# Patient Record
Sex: FEMALE | Race: WHITE | NOT HISPANIC OR LATINO | URBAN - METROPOLITAN AREA
[De-identification: names, ages, dates, MRNs, and addresses within clinical notes are randomized per-mention and may not be internally consistent; named-entity substitution may affect disease eponyms.]

---
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
## 2018-02-01 ENCOUNTER — OFFICE VISIT - RIVER FALLS (OUTPATIENT)
Dept: FAMILY MEDICINE | Facility: CLINIC | Age: 18
End: 2018-02-01

## 2018-02-01 ASSESSMENT — MIFFLIN-ST. JEOR: SCORE: 1411.89

## 2018-04-02 ENCOUNTER — OFFICE VISIT - RIVER FALLS (OUTPATIENT)
Dept: FAMILY MEDICINE | Facility: CLINIC | Age: 18
End: 2018-04-02

## 2022-02-11 VITALS
DIASTOLIC BLOOD PRESSURE: 70 MMHG | TEMPERATURE: 97.3 F | HEART RATE: 68 BPM | SYSTOLIC BLOOD PRESSURE: 110 MMHG | WEIGHT: 134.6 LBS

## 2022-02-11 VITALS
WEIGHT: 142 LBS | SYSTOLIC BLOOD PRESSURE: 100 MMHG | DIASTOLIC BLOOD PRESSURE: 66 MMHG | BODY MASS INDEX: 22.82 KG/M2 | HEIGHT: 66 IN | HEART RATE: 60 BPM | TEMPERATURE: 97.6 F

## 2022-02-11 VITALS
SYSTOLIC BLOOD PRESSURE: 116 MMHG | TEMPERATURE: 98.2 F | HEART RATE: 76 BPM | WEIGHT: 140 LBS | DIASTOLIC BLOOD PRESSURE: 72 MMHG

## 2022-02-16 NOTE — PROGRESS NOTES
Patient:   DESIRAE DEAN            MRN: [id number]            FIN: [id number]               Age:   17 years     Sex:  Female     :  2000   Associated Diagnoses:   Screening for STD (sexually transmitted disease); STD exposure   Author:   Erum Asencio      Chief Complaint   9/15/2017 9:06 AM CDT    STD testing      History of Present Illness   PPC for std screening, partner of 1 month told her he just found out he has chlamydia.  He has received treatment  Desirae tells me there is inconsistent condom use, she has IUD, no fever or abdominal pain   she is having post coital bleeding      Review of Systems   Constitutional:  Negative.    Ear/Nose/Mouth/Throat:  Negative.    Respiratory:  Negative.    Cardiovascular:  Negative.    Gastrointestinal:  Negative.    Genitourinary:  Negative.    Gynecologic:  Negative except as documented in history of present illness.    Hematology/Lymphatics:  Negative.    Endocrine:  Negative.    Immunologic:  Negative.    Neurologic:  Negative.    Psychiatric:  Negative.       Health Status   Allergies:    Allergic Reactions (Selected)  No known allergies   Medications:  (Selected)   Prescriptions  Prescribed  Zithromax 1 g oral liquid: 1 EA ( 1 gm ), PO, Once, # 1 EA, 0 Refill(s), Type: Soft Stop, [id number], if liquid not available please dispense 1gm total of tablets, 1 EA po once  Documented Medications  Documented  Argenis 13.5 mg intrauterine device: 1 EA ( 13.5 mg ), intrauteral, once, 0 Refill(s), Type: Maintenance      Physical Examination   Vital Signs   9/15/2017 9:06 AM CDT Temperature Tympanic 97.3 DegF  LOW    Peripheral Pulse Rate 68 bpm    Pulse Site Radial artery    HR Method Manual    Systolic Blood Pressure 110 mmHg    Diastolic Blood Pressure 70 mmHg    Mean Arterial Pressure 83 mmHg    BP Site Right arm    BP Method Manual      Eye:  Pupils are equal, round and reactive to light.    HENT:  Normocephalic.    Gastrointestinal:   Soft, Non-tender, Non-distended, Normal bowel sounds.    Genitourinary:  No costovertebral angle tenderness.         Groin/ inguinal region: Bilateral, Within normal limits.         Labia: Bilateral, Majora, Minora, Within normal limits.         Vagina: Discharge ( White ).         Cervix: Discharge, IUD strings visible.         Uterus: Within normal limits.         Ovaries: Bilateral, Within normal limits.         Adnexa: Bilateral, Within normal limits.    Musculoskeletal:  Normal range of motion.    Integumentary:  Warm, Dry, Pink.    Neurologic:  Alert, Oriented.    Psychiatric:  Cooperative.       Review / Management   Results review:  Lab results   9/15/2017 9:55 AM CDT Wet Prep Yeast None Seen    Wet Prep Trichomonas None Seen    Wet Prep Clue Cells None Seen   .       Impression and Plan   Diagnosis     Screening for STD (sexually transmitted disease) (TNU92-LH Z11.3).     STD exposure (IXO31-FI Z20.2).     Patient Instructions:       Counseled: Patient, Regarding diagnosis, Regarding treatment, Regarding medications, Activity, Verbalized understanding.    Summary:  STD screening obtained, zithromax and rocephin given  if G+C is positive she will need test for cure in 6-8 weeks, no intercourse for next week  if gonorrhea positive partner needs to be treated  cautioned her about increased risks of PID d/t IUD and STD exposures.    Orders     Orders (Selected)   Prescriptions  Prescribed  Zithromax 1 g oral liquid: 1 EA ( 1 gm ), PO, Once, # 1 EA, 0 Refill(s), Type: Soft Stop, Pharmacy: [facility] [id number], if liquid not available please dispense 1gm total of tablets, 1 EA po once.

## 2022-02-16 NOTE — PROGRESS NOTES
Patient:   TITA DEAN            MRN: [id number]            FIN: [id number]               Age:   17 years     Sex:  Female     :  2000   Associated Diagnoses:   Screening for STD (sexually transmitted disease); Vaginal discharge; Yeast vaginitis   Author:   Michelle Yap      Chief Complaint   2018 2:24 PM CST     Here for STD testing.  has bleeding during intercourse. Has kyleena IUD  On tamiflu currently and antibiotic for ear infection.      History of Present Illness   Chief complaint and concerns discussed with patient and confirmed as above.  Has Kyleena, place elsewhere, she is not sure of the date but is sure it is Kyleena IUD  Infected with chlamydia in Sept, treated, no further IC with that partner. has had one partner since and for the past 2 weeks, again having bleeding with IC (had same with chlamydia infection). No pain, no discharge, no itch, no sores. Not using condoms much  Started meds for respiratory infection recently , but bleeding with IC started before that  WOuld like to repeat other STi infection testing that was done in September      Health Status   Allergies:    Allergic Reactions (Selected)  No known allergies   Medications:  (Selected)   Prescriptions  Prescribed  Zithromax 1 g oral liquid: 1 EA ( 1 gm ), PO, Once, # 1 EA, 0 Refill(s), Type: Soft Stop, Pharmacy: [id number], if liquid not available please dispense 1gm total of tablets, 1 EA po once  Documented Medications  Documented  Argenis 13.5 mg intrauterine device: 1 EA ( 13.5 mg ), intrauteral, once, 0 Refill(s), Type: Maintenance   Problem list:    No problem items selected or recorded.      Histories   Past Medical History:    No active or resolved past medical history items have been selected or recorded.   Family History:    Asthma  Sister  Rheumatoid arthritis  Mother (Mili)     Procedure history:    No active procedure history items have been selected or recorded.   Social History:         Alcohol Assessment            Never      Tobacco Assessment            Never (less than 100 in lifetime)      Substance Abuse Assessment            Never      Home and Environment Assessment            Marital status: Single.      Exercise and Physical Activity Assessment            Exercise frequency: 5-6 times/week.  Exercise type: Soccer.      Sexual Assessment            Sexually active: Yes.  Sexual orientation: Straight or heterosexual.  Uses condoms: Yes.  Contraceptive Use               Details: Intrauterine device.        Physical Examination   Vital Signs   2/1/2018 2:24 PM CST Temperature Temporal 97.6 DegF    Peripheral Pulse Rate 60 bpm    Systolic Blood Pressure 100 mmHg    Diastolic Blood Pressure 66 mmHg    Mean Arterial Pressure 77 mmHg      Measurements from flowsheet : Measurements   2/1/2018 2:24 PM CST Height Measured - Standard 65.75 in    Height/Length Estimated 65.75 in    Weight Measured - Standard 142 lb    Weight Estimated 142 lb    BSA 1.73 m2    BSA Estimated 1.73 m2    Body Mass Index 23.09 kg/m2    Body Mass Index Estimated 23.09 kg/m2    Body Mass Index Percentile 65.60      General:  Alert and oriented.    Genitourinary:  No inguinal tenderness, No urethral discharge, No lesions, vault mildly inflamed with some thick white DC. Blue strings at cervical os approx 1 cm long, no CMT, no adnexal tenderness or masses.       Review / Management   Results review:  Lab results   2/1/2018 2:57 PM CST Wet Prep Yeast Present    Wet Prep Trichomonas None Seen    Wet Prep Clue Cells None Seen   .       Impression and Plan   Diagnosis     Screening for STD (sexually transmitted disease) (OCQ89-UX Z11.3).     Vaginal discharge (VKS48-RE N89.8).     Yeast vaginitis (HMC33-UI B37.3).     Patient Instructions:       Counseled: Patient, Regarding diagnosis, Regarding treatment, Regarding medications, Verbalized understanding, use condoms ALWAYS.    Orders     Orders (Selected)   Outpatient  Orders  Ordered (In Transit)  Chlamydia/Neisseria gonorrhoeae RNA, TMA* ([facility]): Specimen Type: Swab, Collection Date: 02/01/18 14:47:00 CST  HIV-1/2 Antigen and Antibodies, Fourth Generation, with Reflexes* ([facility]): Specimen Type: Serum, Collection Date: 02/01/18 14:49:00 CST  RPR (dx) w/refl titer and confirmatory testing* ([facility]): Specimen Type: Serum, Collection Date: 02/01/18 14:49:00 CST  Prescriptions  Prescribed  fluconazole 150 mg oral tablet: 1 tab(s) ( 150 mg ), PO, Once, # 1 tab(s), 0 Refill(s), Type: Soft Stop, Pharmacy: [facility] [id number], 1 tab(s) po once.

## 2022-02-16 NOTE — PROGRESS NOTES
Patient:   TITA DEAN            MRN: [id number]            FIN: [id number]               Age:   17 years     Sex:  Female     :  2000   Associated Diagnoses:   Blepharitis; Periorbital dermatitis   Author:   Erum Asencio      Visit Information      Date of Service: 2018 04:51 pm  Performing Location: [facility]  Encounter#: [id number]      Chief Complaint   2018 5:10 PM CDT     Pt c/o bilateral eye swelling and itching since this am. Some crust build-up.        History of Present Illness   PPC for bilateral eyelid swelling and irritation which has been present for 12 hours, used new mascara yesterday and has sensitivit skin, within hours of using make up her eyelids began to itch, she removed mascara last night but lids have remain irritated today and puffy, no discharge from eyes, no vision changes,  no occular pain      Review of Systems   Constitutional:  Negative.    Eye:  Negative except as documented in history of present illness.    Ear/Nose/Mouth/Throat:  Negative.    Respiratory:  Negative.    Cardiovascular:  Negative.    Integumentary:  Negative except as documented in history of present illness.    Neurologic:  Negative.    Psychiatric:  Negative.       Health Status   Allergies:    Allergic Reactions (Selected)  No known allergies   Medications:  (Selected)   Documented Medications  Documented  Kyleena 19.5 mg intrauterine device: 1 EA ( 19.5 mg ), intrauteral, once, Instructions: placed 2017, 0 Refill(s), Type: Maintenance      Histories   Family History:    Asthma  Sister  Rheumatoid arthritis  Mother (Mili)        Physical Examination   Vital Signs   2018 5:10 PM CDT Temperature Tympanic 98.2 DegF    Peripheral Pulse Rate 76 bpm    Pulse Site Radial artery    HR Method Manual    Systolic Blood Pressure 116 mmHg    Diastolic Blood Pressure 72 mmHg    Mean Arterial Pressure 87 mmHg    BP Site Right arm    BP Method Manual      General:  Alert  and oriented, No acute distress.    Eye:  Pupils are equal, round and reactive to light, right upper eyelid and left upper eyelid puffy and pink, no open skin, no skin breaks  lower eyelid on left on outer edge pink and swollen  lower eyelid on right outer edge pink swollen   sclerae white without injection, no purulent matter.    HENT:  Normocephalic, Tympanic membranes are clear.    Respiratory:  Respirations are non-labored.    Cardiovascular:  Regular rhythm, No edema.    Musculoskeletal:  Normal range of motion, Normal gait.    Integumentary:  Warm, Dry, Pink.    Neurologic:  Alert, Oriented, Normal sensory, No focal deficits.    Psychiatric:  Cooperative, Appropriate mood & affect, Normal judgment.       Impression and Plan   Diagnosis     Blepharitis (GYZ79-LL H01.009).     Periorbital dermatitis (EQX52-RI L30.9).     Plan:  blepharitis versus zain-orbital dermatitis  stop using mascara, throw it away  continue using zyrtec, can use small amt of 1% hydrocortisone on q tip on lash line for next 3 days to help with itching but not to use this long term  if weekend comes an dlids are still irritated can use amoxicillin or minocycline for zain-orbital dermatitis suppression.

## 2022-05-02 ENCOUNTER — LAB REQUISITION (OUTPATIENT)
Dept: LAB | Facility: CLINIC | Age: 22
End: 2022-05-02

## 2022-05-02 PROCEDURE — 86481 TB AG RESPONSE T-CELL SUSP: CPT | Performed by: INTERNAL MEDICINE

## 2022-05-04 LAB
GAMMA INTERFERON BACKGROUND BLD IA-ACNC: 0.96 IU/ML
M TB IFN-G BLD-IMP: NEGATIVE
M TB IFN-G CD4+ BCKGRND COR BLD-ACNC: 9.04 IU/ML
MITOGEN IGNF BCKGRD COR BLD-ACNC: -0.04 IU/ML
MITOGEN IGNF BCKGRD COR BLD-ACNC: 0 IU/ML
QUANTIFERON MITOGEN: 10 IU/ML
QUANTIFERON NIL TUBE: 0.96 IU/ML
QUANTIFERON TB1 TUBE: 0.92 IU/ML
QUANTIFERON TB2 TUBE: 0.96

## 2022-07-11 ENCOUNTER — OFFICE VISIT (OUTPATIENT)
Dept: FAMILY MEDICINE | Facility: CLINIC | Age: 22
End: 2022-07-11
Payer: COMMERCIAL

## 2022-07-11 VITALS
RESPIRATION RATE: 20 BRPM | SYSTOLIC BLOOD PRESSURE: 111 MMHG | TEMPERATURE: 97.7 F | WEIGHT: 167 LBS | DIASTOLIC BLOOD PRESSURE: 70 MMHG | HEART RATE: 64 BPM | OXYGEN SATURATION: 97 % | BODY MASS INDEX: 27.16 KG/M2

## 2022-07-11 DIAGNOSIS — L03.115 CELLULITIS OF RIGHT LOWER EXTREMITY: Primary | ICD-10-CM

## 2022-07-11 PROCEDURE — 99203 OFFICE O/P NEW LOW 30 MIN: CPT | Performed by: STUDENT IN AN ORGANIZED HEALTH CARE EDUCATION/TRAINING PROGRAM

## 2022-07-11 RX ORDER — CEFPODOXIME PROXETIL 200 MG/1
400 TABLET, FILM COATED ORAL 2 TIMES DAILY
Qty: 28 TABLET | Refills: 0 | Status: SHIPPED | OUTPATIENT
Start: 2022-07-11 | End: 2022-07-18

## 2022-07-11 RX ORDER — IBUPROFEN 200 MG
400 TABLET ORAL
COMMUNITY

## 2022-07-11 NOTE — PROGRESS NOTES
SUBJECTIVE:  Desirae Romero is an 21 year old female who presents for    Right Leg infection  - cyst present for over a year  - worsened over weekend after scratching it  -No fever, chills, other symptoms    PMH:   has no past medical history on file.  There is no problem list on file for this patient.    Social History     Socioeconomic History     Marital status: Single     Spouse name: None     Number of children: None     Years of education: None     Highest education level: None   Tobacco Use     Smoking status: Never Smoker     Smokeless tobacco: Never Used   Substance and Sexual Activity     Drug use: Never     No family history on file.    ALLERGIES:  Patient has no known allergies.    Current Outpatient Medications   Medication     ibuprofen (ADVIL/MOTRIN) 200 MG tablet     levonorgestrel (KYLEENA) 19.5 MG IUD     No current facility-administered medications for this visit.         ROS:  ROS is done and is negative for general/constitutional, eye, ENT, Respiratory, cardiovascular, GI, , Skin, musculoskeletal except as noted elsewhere.  All other review of systems negative except as noted elsewhere.    OBJECTIVE:  /70 (BP Location: Right arm, Patient Position: Sitting, Cuff Size: Adult Regular)   Pulse 64   Temp 97.7  F (36.5  C) (Oral)   Resp 20   Wt 75.8 kg (167 lb)   LMP  (LMP Unknown)   SpO2 97%   BMI 27.16 kg/m    GENERAL APPEARANCE: Alert, in no acute distress.  SKIN: right lateral calf redness/swelling, mild ttp      RESULTS  No results found for any visits on 07/11/22.  No results found for this or any previous visit (from the past 48 hour(s)).    ASSESSMENT/PLAN:    Right lower extremity cellulitis  Patient had a cyst in the region, scratch over that area recently and it began to get red and painful.  No fever or systemic symptoms.  No appreciable abscesses or anything to drain.  Likely cellulitis.  -Cefpodoxime 7 days  -Guidance/precautions given  -Follow-up as needed    PPE  worn: Yes    Options for treatment and follow-up care were reviewed with the patient and/or guardian. Desirae Romero and/or guardian engaged in the decision making process and verbalized understanding of the options discussed and agreed with the final plan.    See [facility] for orders, medications, letters, patient instructions    Daquan Nicholson DO, MBA

## 2023-02-12 ENCOUNTER — HEALTH MAINTENANCE LETTER (OUTPATIENT)
Age: 23
End: 2023-02-12

## 2023-08-17 PROBLEM — A74.9 INFECTION DUE TO CHLAMYDIA SPECIES: Status: ACTIVE | Noted: 2023-08-17

## 2023-08-17 PROBLEM — R87.615 UNSATISFACTORY CERVICAL PAPANICOLAOU SMEAR: Status: ACTIVE | Noted: 2022-04-01

## 2023-08-17 PROBLEM — N72 CHRONIC CERVICITIS: Status: ACTIVE | Noted: 2022-04-01

## 2023-08-17 PROBLEM — B96.89 BV (BACTERIAL VAGINOSIS): Status: ACTIVE | Noted: 2022-04-01

## 2023-08-17 PROBLEM — Z97.5 IUD (INTRAUTERINE DEVICE) IN PLACE: Status: ACTIVE | Noted: 2017-05-23

## 2023-08-17 PROBLEM — N76.0 BV (BACTERIAL VAGINOSIS): Status: ACTIVE | Noted: 2022-04-01

## 2024-03-10 ENCOUNTER — HEALTH MAINTENANCE LETTER (OUTPATIENT)
Age: 24
End: 2024-03-10

## 2025-01-02 ENCOUNTER — OFFICE VISIT (OUTPATIENT)
Dept: MIDWIFE SERVICES | Facility: CLINIC | Age: 25
End: 2025-01-02
Payer: COMMERCIAL

## 2025-01-02 VITALS
BODY MASS INDEX: 21.86 KG/M2 | HEIGHT: 66 IN | WEIGHT: 136 LBS | DIASTOLIC BLOOD PRESSURE: 70 MMHG | SYSTOLIC BLOOD PRESSURE: 114 MMHG

## 2025-01-02 DIAGNOSIS — Z97.5 IUD (INTRAUTERINE DEVICE) IN PLACE: ICD-10-CM

## 2025-01-02 DIAGNOSIS — Z78.9 VEGAN DIET: ICD-10-CM

## 2025-01-02 DIAGNOSIS — R63.4 WEIGHT LOSS: ICD-10-CM

## 2025-01-02 DIAGNOSIS — E28.2 PCOS (POLYCYSTIC OVARIAN SYNDROME): ICD-10-CM

## 2025-01-02 DIAGNOSIS — Z01.419 WELL WOMAN EXAM: Primary | ICD-10-CM

## 2025-01-02 DIAGNOSIS — Z00.00 HEALTHCARE MAINTENANCE: ICD-10-CM

## 2025-01-02 PROBLEM — N76.0 BV (BACTERIAL VAGINOSIS): Status: RESOLVED | Noted: 2022-04-01 | Resolved: 2025-01-02

## 2025-01-02 PROBLEM — A74.9 INFECTION DUE TO CHLAMYDIA SPECIES: Status: RESOLVED | Noted: 2023-08-17 | Resolved: 2025-01-02

## 2025-01-02 PROBLEM — R87.615 UNSATISFACTORY CERVICAL PAPANICOLAOU SMEAR: Status: RESOLVED | Noted: 2022-04-01 | Resolved: 2025-01-02

## 2025-01-02 PROBLEM — B96.89 BV (BACTERIAL VAGINOSIS): Status: RESOLVED | Noted: 2022-04-01 | Resolved: 2025-01-02

## 2025-01-03 NOTE — PROGRESS NOTES
Assessment:   1.  Well woman exam  2.  Contraceptive management-Kyleena IUD  3.  Weight loss, intentional  4.  BMI 22  5.  PCOS evidence on ultrasound 2017     Plan:      1. Discussed nutrition and exercise.  Advised MVI, Vitamin D3 2000IU geltab and an omega 3 supplement daily   2. Blood tests: Future FASTING lipid panel, CBC, TSH with reflex free T4, CMP and hemoglobin A1c.  3. Breast self exam technique reviewed and patient encouraged to perform self-exam monthly.   4. Contraception: Kyleena IUD.  Considering placement July 2021, this writer explains that switching out Kyleena is not indicated until July 2026, patient agrees.  5.  Next pap due July 2026.  6.  Pelvic ultrasound ordered.  7.  RTC 1 year for annual physical exam, PRN    Subjective:   Desirae Romero is a 24 year old female who presents for an annual exam.  She believes her Kyleena IUD needs to be replaced now.  She agrees it was placed in July 2021 and that it is Kyleena for sure.  She is happy, and a relationship that is monogamous with her boyfriend Ramesh.  This last year, she lost about 40 pounds intentionally by going to the gym more often, healthy vegan diet.  She offers no other concerns.  When this writer reviews previous imaging, 2017 pelvic ultrasound reveals polycystic ovaries, patient unaware of this.    Healthy Habits:   Regular Exercise: Yes   Sunscreen Use: Yes  Healthy Diet: Yes  Dental Visits Regularly: Yes  Seat Belt: Yes  Sexually active: Yes  STI risk No  domestic violence No    Self Breast Exam Monthly:Yes  Colonoscopy: No  Lipid Profile: No  Glucose Screen: No  Prevention of Osteoporosis: Yes  Last Dexa: N/A      Immunization History   Administered Date(s) Administered    COVID-19 Bivalent 12+ (Pfizer) 11/02/2022    COVID-19 MONOVALENT 12+ (Pfizer) 09/30/2021, 10/18/2021    DTAP (<7y) 1998, 2000, 02/16/1999, 11/29/1999, 08/14/2003    Flu, Unspecified 09/14/2012, 10/02/2014, 11/04/2015, 01/14/2019, 09/30/2021     B4l9-11 Novel Flu P-free 2010    HEPATITIS A (PEDS 12M-18Y) 2017    HIB, Unspecified 1998, 1998, 1999    HPV Quadrivalent 2010, 10/18/2010, 2011    Hepatitis B, Peds 1998, 1998, 1999    Historic Hib Hib-titer 1998, 1998, 1999    Influenza (H1N1) 2010    Influenza (IIV3) PF 10/27/2008, 2009, 10/18/2010, 2012, 10/02/2014, 2015    Influenza (prior to ) 10/10/2007    Influenza Vaccine (Flucelvax Quadrivalent) 2021    Influenza Vaccine 65+ (Fluzone HD) 2021    Influenza Vaccine >6 months,quad, PF 2015, 2022, 2023    Influenza, Split Virus, Trivalent, Pf (Fluzone\Fluarix) 2024    Influenza,INJ,MDCK,PF,Quad >6mo(Flucelvax) 2019    MMR 1999, 2003, 2003    Meningococcal (Menomune ) 2014    Meningococcal ACWY (Menactra ) 2014    Meningococcal ACWY (Menveo ) 2017    Meningococcal Mcv4 Conjugate,unspecified  2014, 2017    Meningococcal,unspecified 2014    Poliovirus, inactivated (IPV) 1998, 1998, 1999, 2003    TD,PF 7+ (Tenivac) 2020    TDAP (Adacel,Boostrix) 2010, 2021    Td (Adult), Adsorbed 2020    Varicella 1999, 2007         Gynecologic History  No LMP recorded. (Menstrual status: IUD).  Contraception: Kyleena IUD    Cancer screening:   Last Pap: 2023. Results were: Normal    OB History    Para Term  AB Living   0 0 0 0 0 0   SAB IAB Ectopic Multiple Live Births   0 0 0 0 0       Current Outpatient Medications   Medication Sig Dispense Refill    ibuprofen (ADVIL/MOTRIN) 200 MG tablet Take 400 mg by mouth      levonorgestrel (KYLEENA) 19.5 MG IUD 1 Device by Intrauterine route       Past Medical History:   Diagnosis Date    Abnormal Pap smear of cervix     Chlamydia     Depression     Urinary tract infection     Yeast infection of the vagina   "    Past Surgical History:   Procedure Laterality Date    tosillectomy  2010    WISDOM TOOTH EXTRACTION  2016     Patient has no known allergies.  History reviewed. No pertinent family history.  Social History     Socioeconomic History    Marital status: Single     Spouse name: Not on file    Number of children: 0    Years of education: Not on file    Highest education level: Associate degree: occupational, technical, or vocational program   Occupational History    Not on file   Tobacco Use    Smoking status: Never    Smokeless tobacco: Never   Substance and Sexual Activity    Alcohol use: Not on file    Drug use: Never    Sexual activity: Not on file   Other Topics Concern    Not on file   Social History Narrative    Not on file     Social Drivers of Health     Financial Resource Strain: Not on file   Food Insecurity: Not on file   Transportation Needs: Not on file   Physical Activity: Not on file   Stress: Not on file   Social Connections: Not on file   Interpersonal Safety: Not on file   Housing Stability: Not on file       Review of Systems  General:  Denies problem  Eyes: Denies problem  Ears/Nose/Throat: Denies problem  Cardiovascular: Denies problem  Respiratory:  Denies problem  Gastrointestinal:  denies problems  Genitourinary: denies problems  Musculoskeletal:  Denies problem  Skin: Denies problem  Neurologic:denies problems  Psychiatric: denies problems  Endocrine: denies problems        Objective:     Vitals:    01/02/25 1503   BP: 114/70   Weight: 61.7 kg (136 lb)   Height: 1.664 m (5' 5.5\")       Physical Exam:  General Appearance: Alert, cooperative, no distress, appears stated age  Skin: Skin color, texture, turgor normal, no rashes or lesions  Throat: Lips, mucosa, and tongue normal; teeth and gums normal  HEENT: grossly normal; otoscopic and opthalmic exam not performed.   Neck: Supple, symmetrical, trachea midline;  thyroid: not enlarged, symmetric, no tenderness/mass/nodules  Lungs: Clear to " auscultation bilaterally, respirations unlabored  Breasts: No breast masses, tenderness, asymmetry, or nipple discharge.  Heart: Regular rate and rhythm, S1 and S2 normal, no murmur  Abdomen: Soft, non-tender.   Pelvic: Deferred, pelvic ultrasound ordered    US PELVIS COMPLETE TV  Order: [id number]  Impression    1. Uterus is normal in appearance with a properly positioned intrauterine  device.  3mm of blood moving in the endometrium from the fundus to the  lower uterine segment.  2. Bilateral polycystic ovaries.    Tammy Rios MD 6/1/2017 6:35 PM    [address] [address]  [phone]    Narrative    Gynecologic Ultrasound    Date of exam: 6/1/2017  Indication for exam: Breakthrough bleeding with IUD  Requesting Provider: Jolene Romo DO    TECHNIQUE:  Transabdominal scan was not performed. Transvaginal scan was performed for  improved visualization of the pelvic structures.    FINDINGS:  The uterus is present, anteverted, without deviation, and measures 6.4 x  2.5 x 3.7 cm.  The myometrium is homogenous.  There is evidence of properly positioned intrauterine device.  The endometrial thickness is 5.3 mm.  On the sine loops there is a lot of  movement of the endometrium with a density consistent with blood in the  endometrium.  There are no myomas noted.  The right ovary is identified and measures 2.6 x 2.1 x 2.2 cm and appears  polycystic.  The left ovary is identified and measures 3.1 x 1.4 x 2.4 cm and appears  polycystic.  Normal color and spectral doppler flow to both ovaries.  Free fluid in the cul de sac: none  Images on Order [id number]    Image Retrieve    The full-size image has not yet been retrieved from an outside organization. To retrieve, click the link below.  Scan on 6/1/2017 10:44 AM: US PELVIS COMPLETE TV        Exam End: 06/01/17 10:44 AM Last Resulted: 06/01/17  6:40 PM   [facility]  Result Received: 12/31/24   1:15 PM

## 2025-01-07 ENCOUNTER — HOSPITAL ENCOUNTER (OUTPATIENT)
Dept: ULTRASOUND IMAGING | Facility: HOSPITAL | Age: 25
Discharge: HOME | End: 2025-01-07
Attending: ADVANCED PRACTICE MIDWIFE
Payer: COMMERCIAL

## 2025-01-07 DIAGNOSIS — Z00.00 HEALTHCARE MAINTENANCE: ICD-10-CM

## 2025-01-07 DIAGNOSIS — Z97.5 IUD (INTRAUTERINE DEVICE) IN PLACE: ICD-10-CM

## 2025-01-07 PROCEDURE — 76856 US EXAM PELVIC COMPLETE: CPT

## 2025-01-09 ENCOUNTER — LAB (OUTPATIENT)
Dept: LAB | Facility: CLINIC | Age: 25
End: 2025-01-09
Payer: COMMERCIAL

## 2025-01-09 DIAGNOSIS — Z00.00 HEALTHCARE MAINTENANCE: ICD-10-CM

## 2025-01-09 LAB
ALBUMIN SERPL BCG-MCNC: 4.8 G/DL (ref 3.5–5.2)
ALP SERPL-CCNC: 45 U/L (ref 40–150)
ALT SERPL W P-5'-P-CCNC: 17 U/L (ref 0–50)
ANION GAP SERPL CALCULATED.3IONS-SCNC: 11 MMOL/L (ref 7–15)
AST SERPL W P-5'-P-CCNC: 28 U/L (ref 0–45)
BILIRUB SERPL-MCNC: 0.5 MG/DL
BUN SERPL-MCNC: 10.5 MG/DL (ref 6–20)
CALCIUM SERPL-MCNC: 10 MG/DL (ref 8.8–10.4)
CHLORIDE SERPL-SCNC: 103 MMOL/L (ref 98–107)
CHOLEST SERPL-MCNC: 175 MG/DL
CREAT SERPL-MCNC: 0.89 MG/DL (ref 0.51–0.95)
EGFRCR SERPLBLD CKD-EPI 2021: >90 ML/MIN/1.73M2
ERYTHROCYTE [DISTWIDTH] IN BLOOD BY AUTOMATED COUNT: 12.5 % (ref 10–15)
EST. AVERAGE GLUCOSE BLD GHB EST-MCNC: 82 MG/DL
FASTING STATUS PATIENT QL REPORTED: YES
FASTING STATUS PATIENT QL REPORTED: YES
GLUCOSE SERPL-MCNC: 90 MG/DL (ref 70–99)
HBA1C MFR BLD: 4.5 % (ref 0–5.6)
HCO3 SERPL-SCNC: 25 MMOL/L (ref 22–29)
HCT VFR BLD AUTO: 44.4 % (ref 35–47)
HDLC SERPL-MCNC: 86 MG/DL
HGB BLD-MCNC: 14.9 G/DL (ref 11.7–15.7)
LDLC SERPL CALC-MCNC: 69 MG/DL
MCH RBC QN AUTO: 31.2 PG (ref 26.5–33)
MCHC RBC AUTO-ENTMCNC: 33.6 G/DL (ref 31.5–36.5)
MCV RBC AUTO: 93 FL (ref 78–100)
NONHDLC SERPL-MCNC: 89 MG/DL
PLATELET # BLD AUTO: 201 10E3/UL (ref 150–450)
POTASSIUM SERPL-SCNC: 4.3 MMOL/L (ref 3.4–5.3)
PROT SERPL-MCNC: 7.5 G/DL (ref 6.4–8.3)
RBC # BLD AUTO: 4.77 10E6/UL (ref 3.8–5.2)
SODIUM SERPL-SCNC: 139 MMOL/L (ref 135–145)
TRIGL SERPL-MCNC: 100 MG/DL
TSH SERPL DL<=0.005 MIU/L-ACNC: 3.66 UIU/ML (ref 0.3–4.2)
WBC # BLD AUTO: 6.4 10E3/UL (ref 4–11)

## 2025-03-11 ENCOUNTER — OFFICE VISIT (OUTPATIENT)
Dept: URGENT CARE | Facility: URGENT CARE | Age: 25
End: 2025-03-11
Payer: COMMERCIAL

## 2025-03-11 VITALS
RESPIRATION RATE: 16 BRPM | TEMPERATURE: 98.3 F | DIASTOLIC BLOOD PRESSURE: 80 MMHG | SYSTOLIC BLOOD PRESSURE: 138 MMHG | OXYGEN SATURATION: 97 % | HEART RATE: 85 BPM

## 2025-03-11 DIAGNOSIS — J10.1 INFLUENZA A: Primary | ICD-10-CM

## 2025-03-11 DIAGNOSIS — R50.9 FEVER, UNSPECIFIED: ICD-10-CM

## 2025-03-11 LAB
FLUAV AG SPEC QL IA: POSITIVE
FLUBV AG SPEC QL IA: NEGATIVE

## 2025-03-11 PROCEDURE — 3079F DIAST BP 80-89 MM HG: CPT | Performed by: STUDENT IN AN ORGANIZED HEALTH CARE EDUCATION/TRAINING PROGRAM

## 2025-03-11 PROCEDURE — 99213 OFFICE O/P EST LOW 20 MIN: CPT | Performed by: STUDENT IN AN ORGANIZED HEALTH CARE EDUCATION/TRAINING PROGRAM

## 2025-03-11 PROCEDURE — 3075F SYST BP GE 130 - 139MM HG: CPT | Performed by: STUDENT IN AN ORGANIZED HEALTH CARE EDUCATION/TRAINING PROGRAM

## 2025-03-11 PROCEDURE — 87804 INFLUENZA ASSAY W/OPTIC: CPT | Performed by: STUDENT IN AN ORGANIZED HEALTH CARE EDUCATION/TRAINING PROGRAM

## 2025-03-11 RX ORDER — BENZONATATE 100 MG/1
100 CAPSULE ORAL 3 TIMES DAILY PRN
Qty: 30 CAPSULE | Refills: 0 | Status: SHIPPED | OUTPATIENT
Start: 2025-03-11

## 2025-03-11 RX ORDER — OSELTAMIVIR PHOSPHATE 75 MG/1
75 CAPSULE ORAL 2 TIMES DAILY
Qty: 10 CAPSULE | Refills: 0 | Status: SHIPPED | OUTPATIENT
Start: 2025-03-11 | End: 2025-03-16

## 2025-03-11 NOTE — LETTER
March 11, 2025      Desirae Romero  [address]  [place] [place] [address]        To Whom It May Concern:    Desirae Romero  was seen on 3/11/25.  Please excuse her 3/10/25-3/11/25 due to illness. May return when fever-free for 24 hours and symptoms have improved.         Sincerely,        Gillian Melgar PA-C    Electronically signed

## 2025-03-11 NOTE — PROGRESS NOTES
ASSESSMENT & PLAN:   Diagnoses and all orders for this visit:  Influenza A  -     oseltamivir (TAMIFLU) 75 MG capsule; Take 1 capsule (75 mg) by mouth 2 times daily for 5 days.  -     benzonatate (TESSALON) 100 MG capsule; Take 1 capsule (100 mg) by mouth 3 times daily as needed for cough.  Fever, unspecified  -     Influenza A & B Antigen - Clinic Collect    URI symptoms x 2 days. Vital stable. Clear lung sounds with no increased work of breathing. Influenza test positive for influenza A. She is within treatment window, Tamiflu x 5 days. Supportive cares discussed including rest, fluids, Tylenol/ibuprofen, Tessalon as needed.    At the end of the encounter, I discussed results, diagnosis, medications. Discussed red flags for immediate return to clinic/ER, as well as indications for follow up if no improvement. Patient and/or caregiver understood and agreed to plan. Patient was stable for discharge.    Patient Instructions   Influenza is typically considered contagious one day prior and 3-7 days after your symptoms begin. I recommend returning to work/school after you are fever free for 24 hours. Continue to practice good hand hygiene and cough coverage well after you are fever free.   Take Tylenol or Ibuprofen as needed for fever relief.   Increase fluids and rest.  Follow up if you develop fever that can not be controlled, difficulty breathing, or severe dehydration.    Influenza  Influenza ( the flu ) is an infection that affects your respiratory tract (the mouth, nose, and lungs, and the passages between them). Unlike a cold, the flu can make you very ill. And it can lead to pneumonia, a serious lung infection. For some people, especially older adults, young children, and people with certain chronic conditions, the flu can have serious complications and even be fatal.    How Does the Flu Spread?  The flu is caused by viruses. The viruses spread through the air in droplets when someone who has the flu coughs,  sneezes, laughs, or talks. You can become infected when you inhale these viruses directly. You can also become infected when you touch a surface on which the droplets have landed and then transfer the germs to your eyes, nose, or mouth. Touching used tissues, or sharing utensils, drinking glasses, or a toothbrush with an infected person can expose you to flu viruses, too.    What Are the Symptoms of the Flu?  Flu symptoms tend to come on quickly and may last a few days to a few weeks. They include:  Fever usually higher than 101 F (38.3 C) and chills  Sore throat and headache  Dry cough  Runny nose  Tiredness and weakness  Muscle aches    Factors That Can Make Flu Worse  For some people, the flu can be very serious. The risk of complications is greater for:  Children under age 5.  Adults 65 years of age and older.  People with a chronic illness, such as diabetes or heart, kidney, or lung disease.  People who live in a nursing home or long-term care facility.    How Is the Flu Treated?  Influenza usually improves after 7 days or so. In some cases, your health care provider may prescribe an antiviral medication. This may help you get well sooner. For the medication to help, you need to take it as soon as possible (ideally within 48 hours) after your symptoms start. If you develop pneumonia or other serious illness, hospital care may be needed.    Easing Flu Symptoms  Drink lots of fluids such as water, juice, and warm soup. A good rule is to drink enough so that you urinate your normal amount.  Get plenty of rest.  Tylenol/ibuprofen for fever.  Call your provider if you become dizzy, lightheaded, or short of breath.     No follow-ups on file.    ------------------------------------------------------------------------  SUBJECTIVE  History was obtained from patient.    Patient presents with:  Cough: X 2 days, cold flu symptoms, sinus, Lt ear pain, runny nose, fever started Sunday night, sweating, headaches, nauseous,  deep cough, no chest pain or wheezing    HPI  Desirae Romero is a(n) 24 year old female presenting to urgent care for URI symptoms x 2 days. Reports cough, sinus congestion, headache, cough, rhinorrhea, fever, fatigue. No sore throat. No diarrhea or vomiting. No known sick contacts.     Current Outpatient Medications   Medication Sig Dispense Refill    benzonatate (TESSALON) 100 MG capsule Take 1 capsule (100 mg) by mouth 3 times daily as needed for cough. 30 capsule 0    ibuprofen (ADVIL/MOTRIN) 200 MG tablet Take 400 mg by mouth      levonorgestrel (KYLEENA) 19.5 MG IUD 1 Device by Intrauterine route      oseltamivir (TAMIFLU) 75 MG capsule Take 1 capsule (75 mg) by mouth 2 times daily for 5 days. 10 capsule 0     Problem List:  2025-01: Weight loss  2025-01: PCOS (polycystic ovarian syndrome)  2025-01: Vegan diet  2023-08: Infection due to Chlamydia species  2022-04: BV (bacterial vaginosis)  2022-04: Chronic cervicitis  2022-04: Unsatisfactory cervical Papanicolaou smear  2017-05: IUD (intrauterine device) in place  2004-02: Astigmatism    No Known Allergies      OBJECTIVE  Vitals:    03/11/25 1053   BP: 138/80   Pulse: 85   Resp: 16   Temp: 98.3  F (36.8  C)   TempSrc: Oral   SpO2: 97%     Physical Exam   GENERAL: healthy, alert, no acute distress.   PSYCH: mentation appears normal. Normal affect  HEAD: normocephalic, atraumatic.  EYE: PERRL. EOMs intact. No scleral injection bilaterally.   EAR: external ear normal. Bilateral ear canals normal and nonpainful. Bilateral posterior oropharynx TM intact, pearly, translucent without bulging.  NOSE: external nose atraumatic without lesions.  OROPHARYNX: moist mucous membranes. Posterior oropharynx without erythema or exudate. Uvula midline. Patent airway.  LUNGS: no increased work of breathing. Clear lung sounds bilaterally. No wheezing, rhonchi, or rales.   CV: regular rate and rhythm. No clicks, murmurs, or rubs.    Results for orders placed or performed in  visit on 03/11/25   Influenza A & B Antigen - Clinic Collect     Status: Abnormal    Specimen: Nose; Swab   Result Value Ref Range    Influenza A antigen Positive (A) Negative    Influenza B antigen Negative Negative    Narrative    Test results must be correlated with clinical data. If necessary, results should be confirmed by a molecular assay or viral culture.

## 2025-05-18 ENCOUNTER — HOSPITAL ENCOUNTER (EMERGENCY)
Facility: CLINIC | Age: 25
Discharge: HOME | End: 2025-05-18
Payer: COMMERCIAL

## 2025-05-18 VITALS
RESPIRATION RATE: 17 BRPM | TEMPERATURE: 97.3 F | DIASTOLIC BLOOD PRESSURE: 70 MMHG | HEART RATE: 59 BPM | OXYGEN SATURATION: 100 % | SYSTOLIC BLOOD PRESSURE: 107 MMHG

## 2025-05-18 DIAGNOSIS — J06.9 VIRAL URI WITH COUGH: ICD-10-CM

## 2025-05-18 LAB — S PYO DNA THROAT QL NAA+PROBE: NOT DETECTED

## 2025-05-18 PROCEDURE — 87651 STREP A DNA AMP PROBE: CPT

## 2025-05-18 PROCEDURE — 99213 OFFICE O/P EST LOW 20 MIN: CPT

## 2025-05-18 PROCEDURE — G0463 HOSPITAL OUTPT CLINIC VISIT: HCPCS

## 2025-05-18 RX ORDER — PREDNISONE 20 MG/1
TABLET ORAL
Qty: 10 TABLET | Refills: 0 | Status: SHIPPED | OUTPATIENT
Start: 2025-05-18

## 2025-05-18 RX ORDER — BENZONATATE 100 MG/1
200 CAPSULE ORAL 3 TIMES DAILY PRN
Qty: 30 CAPSULE | Refills: 0 | Status: SHIPPED | OUTPATIENT
Start: 2025-05-18

## 2025-05-18 ASSESSMENT — COLUMBIA-SUICIDE SEVERITY RATING SCALE - C-SSRS
2. HAVE YOU ACTUALLY HAD ANY THOUGHTS OF KILLING YOURSELF IN THE PAST MONTH?: NO
6. HAVE YOU EVER DONE ANYTHING, STARTED TO DO ANYTHING, OR PREPARED TO DO ANYTHING TO END YOUR LIFE?: NO
1. IN THE PAST MONTH, HAVE YOU WISHED YOU WERE DEAD OR WISHED YOU COULD GO TO SLEEP AND NOT WAKE UP?: NO

## 2025-05-18 NOTE — ED PROVIDER NOTES
Chief Complaint:   No chief complaint on file.        HPI:   Desirae Romero is a 24 year old female who presents to  for evaluation of sore throat and cough. Symptoms initially began 3 days ago. She has tried taking oral analgesics, cough drops and Mucinex with some improvement of symptoms. She denies chest congestion, chest pain, decreased appetite, decreased urine output, diarrhea, ear pain, fatigue, fever, headache, nasal congestion, nausea, shortness of breath, vomiting, and wheezing.     Problem List:    Patient Active Problem List    Diagnosis Date Noted    Weight loss 01/02/2025     Priority: Medium     2024: Intentional, 40 pounds      PCOS (polycystic ovarian syndrome) 01/02/2025     Priority: Medium     2017 appreciated on ultrasound      Vegan diet 01/02/2025     Priority: Medium    Chronic cervicitis 04/01/2022     Priority: Medium    IUD (intrauterine device) in place 05/23/2017     Priority: Medium     Formatting of this note might be different from the original. Xochilt; placed by Dr. Jolene Romo on 05/23/2017;  replaced on 7/28/2021 by Antonio Rios MD      Astigmatism 02/24/2004     Priority: Medium        Past Medical History:    Past Medical History:   Diagnosis Date    Abnormal Pap smear of cervix     Chlamydia     Depression     Urinary tract infection     Yeast infection of the vagina        Past Surgical History:    Past Surgical History:   Procedure Laterality Date    tosillectomy  2010    WISDOM TOOTH EXTRACTION  2016       Meds:   Current Outpatient Medications   Medication Sig Dispense Refill    benzonatate (TESSALON) 100 MG capsule Take 2 capsules (200 mg) by mouth 3 times daily as needed for cough. 30 capsule 0    predniSONE (DELTASONE) 20 MG tablet Take two tablets (= 40mg) each day for 5 (five) days 10 tablet 0    benzonatate (TESSALON) 100 MG capsule Take 1 capsule (100 mg) by mouth 3 times daily as needed for cough. 30 capsule 0    ibuprofen (ADVIL/MOTRIN) 200 MG tablet  Take 400 mg by mouth      levonorgestrel (KYLEENA) 19.5 MG IUD 1 Device by Intrauterine route         Allergies:   No Known Allergies    Medications updated and reviewed.  Past, family and surgical history is updated and reviewed in the record.     Review of Systems:  Pertinent review of systems as documented per HPI above.    Physical Exam:   /70   Pulse 59   Temp 97.3  F (36.3  C) (Tympanic)   Resp 17   SpO2 100%    General: alert and no acute distress  Eyes: negative, conjunctivae not injected /corneas clear. PERRL, EOM's intact.  Ears: negative, External ears normal. Canals clear. TM's normal.  Nose: Nares normal and no rhinorrhea  Mouth/Throat: moist mucus membranes, moderate oropharyngeal erythema, no exudate.  No PTA.  Voice is not muffled.  Tolerate secretions.  Neck: Neck supple, no adenopathy  Chest/Pulmonary: CTAB without wheezes, stridor, or rhonchi. No signs of respiratory distress.  Cardiovascular: RRR normal S1S2  Abdomen: Bowel sounds normoactive, abdomen soft without tenderness, guarding or rigidity. No HSM.   Skin:  Skin color, texture, turgor normal. No rashes or lesions.    Results for orders placed or performed during the hospital encounter of 05/18/25 (from the past 48 hours)   Group A Streptococcus PCR Throat Swab    Specimen: Throat; Swab   Result Value Ref Range    Group A strep by PCR Not Detected Not Detected    Narrative    The Xpert Xpress Strep A test, performed on the [employer], is a rapid, qualitative in vitro diagnostic test for the detection of Streptococcus pyogenes (Group A ß-hemolytic Streptococcus, Strep A) in throat swab specimens from patients with signs and symptoms of pharyngitis. The Xpert Xpress Strep A test can be used as an aid in the diagnosis of Group A Streptococcal pharyngitis. The assay is not intended to monitor treatment for Group A Streptococcus infections. The Xpert Xpress Strep A test utilizes an automated real-time polymerase  chain reaction (PCR) to detect Streptococcus pyogenes DNA.       Assessment:  Viral upper respiratory illness with cough    Plan:   24-year-old female presents for evaluation of sore throat and cough that began 3 days ago.    Patient well-appearing on arrival, afebrile.  On examination there is moderate oropharyngeal erythema.  Voice is not muffled, tolerate secretions.  Lungs are clear without signs of respiratory distress.  Remainder of exam reassuring as above.  Strep test negative.  Suspect viral illness given symptom character and duration.  Patient reports cough is bothersome and does not improve with supportive cares, I sent prednisone and Tessalon to aid with her symptoms. Advised that if symptoms are not improving despite this treatment plan, then they should follow-up with primary provider for reevaluation. Strict UC/ED return precautions discussed in detail including prolonged fevers, development of shortness of breath or trouble with breathing, weakness or lightheadedness, inability to tolerate oral intake or anything else that is concerning to them. All questions were answered. Pt verbalized understanding and agreement with the above plan.      Condition on disposition: Stable    Disclaimer: This note consists of symbols derived from keyboarding, dictation, and/or voice recognition software. As a result, there may be errors in the script that have gone undetected.  Please consider this when interpreting information found in the chart.         Veronica Buchanan PA-C  05/18/25 [id number]